# Patient Record
Sex: MALE | Race: WHITE | Employment: OTHER | URBAN - METROPOLITAN AREA
[De-identification: names, ages, dates, MRNs, and addresses within clinical notes are randomized per-mention and may not be internally consistent; named-entity substitution may affect disease eponyms.]

---

## 2024-06-20 ENCOUNTER — HOSPITAL ENCOUNTER (EMERGENCY)
Age: 49
Discharge: HOME | End: 2024-06-21
Attending: EMERGENCY MEDICINE

## 2024-06-20 DIAGNOSIS — F10.920 ACUTE ALCOHOLIC INTOXICATION WITHOUT COMPLICATION (HCC): ICD-10-CM

## 2024-06-20 DIAGNOSIS — R45.851 SUICIDAL IDEATION: Primary | ICD-10-CM

## 2024-06-20 LAB
ALBUMIN SERPL-MCNC: 4.5 G/DL (ref 3.5–5.2)
ALBUMIN/GLOB SERPL: 1.6 {RATIO} (ref 1–2.5)
ALP SERPL-CCNC: 98 U/L (ref 40–129)
ALT SERPL-CCNC: 23 U/L (ref 5–41)
AMPHET UR QL SCN: NEGATIVE
ANION GAP SERPL CALCULATED.3IONS-SCNC: 14 MMOL/L (ref 9–17)
APAP SERPL-MCNC: <5 UG/ML (ref 10–30)
AST SERPL-CCNC: 48 U/L
BARBITURATES UR QL SCN: NEGATIVE
BASOPHILS # BLD: 0.1 K/UL (ref 0–0.2)
BASOPHILS NFR BLD: 3 % (ref 0–2)
BENZODIAZ UR QL: NEGATIVE
BILIRUB SERPL-MCNC: 0.3 MG/DL (ref 0.3–1.2)
BILIRUB UR QL STRIP: NEGATIVE
BUN SERPL-MCNC: 8 MG/DL (ref 6–20)
CALCIUM SERPL-MCNC: 8.6 MG/DL (ref 8.6–10.4)
CANNABINOIDS UR QL SCN: POSITIVE
CHLORIDE SERPL-SCNC: 102 MMOL/L (ref 98–107)
CLARITY UR: CLEAR
CO2 SERPL-SCNC: 26 MMOL/L (ref 20–31)
COCAINE UR QL SCN: NEGATIVE
COLOR UR: YELLOW
COMMENT: NORMAL
CREAT SERPL-MCNC: 0.8 MG/DL (ref 0.7–1.2)
EOSINOPHIL # BLD: 0 K/UL (ref 0–0.4)
EOSINOPHILS RELATIVE PERCENT: 0 % (ref 1–4)
ERYTHROCYTE [DISTWIDTH] IN BLOOD BY AUTOMATED COUNT: 14.3 % (ref 12.5–15.4)
ETHANOL PERCENT: 0.21 %
ETHANOL PERCENT: 0.3 %
ETHANOLAMINE SERPL-MCNC: 210 MG/DL (ref 0–0.08)
ETHANOLAMINE SERPL-MCNC: 301 MG/DL (ref 0–0.08)
FENTANYL UR QL: NEGATIVE
GFR, ESTIMATED: >90 ML/MIN/1.73M2
GLUCOSE SERPL-MCNC: 108 MG/DL (ref 70–99)
GLUCOSE UR STRIP-MCNC: NEGATIVE MG/DL
HCT VFR BLD AUTO: 38.3 % (ref 41–53)
HGB BLD-MCNC: 12.9 G/DL (ref 13.5–17.5)
HGB UR QL STRIP.AUTO: NEGATIVE
KETONES UR STRIP-MCNC: NEGATIVE MG/DL
LEUKOCYTE ESTERASE UR QL STRIP: NEGATIVE
LIPASE SERPL-CCNC: 32 U/L (ref 13–60)
LYMPHOCYTES NFR BLD: 1.1 K/UL (ref 1–4.8)
LYMPHOCYTES RELATIVE PERCENT: 33 % (ref 24–44)
MCH RBC QN AUTO: 31.9 PG (ref 26–34)
MCHC RBC AUTO-ENTMCNC: 33.7 G/DL (ref 31–37)
MCV RBC AUTO: 94.6 FL (ref 80–100)
METHADONE UR QL: NEGATIVE
MONOCYTES NFR BLD: 0.4 K/UL (ref 0.1–1.2)
MONOCYTES NFR BLD: 11 % (ref 2–11)
NEUTROPHILS NFR BLD: 53 % (ref 36–66)
NEUTS SEG NFR BLD: 1.8 K/UL (ref 1.8–7.7)
NITRITE UR QL STRIP: NEGATIVE
OPIATES UR QL SCN: NEGATIVE
OXYCODONE UR QL SCN: NEGATIVE
PCP UR QL SCN: NEGATIVE
PH UR STRIP: 6.5 [PH] (ref 5–8)
PLATELET # BLD AUTO: 142 K/UL (ref 140–450)
PMV BLD AUTO: 7.6 FL (ref 6–12)
POTASSIUM SERPL-SCNC: 4.1 MMOL/L (ref 3.7–5.3)
PROT SERPL-MCNC: 7.4 G/DL (ref 6.4–8.3)
PROT UR STRIP-MCNC: NEGATIVE MG/DL
RBC # BLD AUTO: 4.05 M/UL (ref 4.5–5.9)
SALICYLATES SERPL-MCNC: <1 MG/DL (ref 3–10)
SODIUM SERPL-SCNC: 142 MMOL/L (ref 135–144)
SP GR UR STRIP: 1.01 (ref 1–1.03)
TEST INFORMATION: ABNORMAL
TROPONIN I SERPL HS-MCNC: 8 NG/L (ref 0–22)
UROBILINOGEN UR STRIP-ACNC: NORMAL EU/DL (ref 0–1)
WBC OTHER # BLD: 3.4 K/UL (ref 3.5–11)

## 2024-06-20 PROCEDURE — 93005 ELECTROCARDIOGRAM TRACING: CPT | Performed by: NURSE PRACTITIONER

## 2024-06-20 PROCEDURE — 36415 COLL VENOUS BLD VENIPUNCTURE: CPT

## 2024-06-20 PROCEDURE — 80143 DRUG ASSAY ACETAMINOPHEN: CPT

## 2024-06-20 PROCEDURE — 96376 TX/PRO/DX INJ SAME DRUG ADON: CPT | Performed by: EMERGENCY MEDICINE

## 2024-06-20 PROCEDURE — 99284 EMERGENCY DEPT VISIT MOD MDM: CPT | Performed by: EMERGENCY MEDICINE

## 2024-06-20 PROCEDURE — G0480 DRUG TEST DEF 1-7 CLASSES: HCPCS

## 2024-06-20 PROCEDURE — 85025 COMPLETE CBC W/AUTO DIFF WBC: CPT

## 2024-06-20 PROCEDURE — 96361 HYDRATE IV INFUSION ADD-ON: CPT | Performed by: EMERGENCY MEDICINE

## 2024-06-20 PROCEDURE — 81003 URINALYSIS AUTO W/O SCOPE: CPT

## 2024-06-20 PROCEDURE — 2580000003 HC RX 258: Performed by: NURSE PRACTITIONER

## 2024-06-20 PROCEDURE — 6360000002 HC RX W HCPCS: Performed by: NURSE PRACTITIONER

## 2024-06-20 PROCEDURE — 84484 ASSAY OF TROPONIN QUANT: CPT

## 2024-06-20 PROCEDURE — 96374 THER/PROPH/DIAG INJ IV PUSH: CPT | Performed by: EMERGENCY MEDICINE

## 2024-06-20 PROCEDURE — 80179 DRUG ASSAY SALICYLATE: CPT

## 2024-06-20 PROCEDURE — 80307 DRUG TEST PRSMV CHEM ANLYZR: CPT

## 2024-06-20 PROCEDURE — 83690 ASSAY OF LIPASE: CPT

## 2024-06-20 PROCEDURE — 80053 COMPREHEN METABOLIC PANEL: CPT

## 2024-06-20 RX ORDER — LORAZEPAM 2 MG/ML
1 INJECTION INTRAMUSCULAR ONCE
Status: COMPLETED | OUTPATIENT
Start: 2024-06-20 | End: 2024-06-20

## 2024-06-20 RX ORDER — SODIUM CHLORIDE, SODIUM LACTATE, POTASSIUM CHLORIDE, AND CALCIUM CHLORIDE .6; .31; .03; .02 G/100ML; G/100ML; G/100ML; G/100ML
1000 INJECTION, SOLUTION INTRAVENOUS ONCE
Status: COMPLETED | OUTPATIENT
Start: 2024-06-20 | End: 2024-06-20

## 2024-06-20 RX ADMIN — SODIUM CHLORIDE, POTASSIUM CHLORIDE, SODIUM LACTATE AND CALCIUM CHLORIDE 1000 ML: 600; 310; 30; 20 INJECTION, SOLUTION INTRAVENOUS at 17:25

## 2024-06-20 RX ADMIN — LORAZEPAM 1 MG: 2 INJECTION INTRAMUSCULAR; INTRAVENOUS at 20:11

## 2024-06-20 RX ADMIN — LORAZEPAM 1 MG: 2 INJECTION INTRAMUSCULAR; INTRAVENOUS at 17:25

## 2024-06-20 ASSESSMENT — PAIN SCALES - GENERAL: PAINLEVEL_OUTOF10: 0

## 2024-06-20 ASSESSMENT — PAIN - FUNCTIONAL ASSESSMENT: PAIN_FUNCTIONAL_ASSESSMENT: 0-10

## 2024-06-20 NOTE — PROGRESS NOTES
SPIRITUAL CARE DEPARTMENT [facility]  PROGRESS NOTE    Room # ER04/ER04   Name: Britton Navarro            Restorationism: Taoism    Reason for visit: Emotional Distress    I visited the patient.    Admit Date & Time: 6/20/2024  4:04 PM    Assessment:  Britton Navarro is a 49 y.o. male in the hospital because no active principal problem. Upon entering the room Pt was very friendly and welcoming. Pt was open to conversation.      Intervention:  I introduced myself and my title as  I offered space for Pt  to express feelings, needs, and concerns and provided a ministry presence.  provided empathic Listening and support and spiritual care.  provided prayer and scripture as requested to Pt. Great conversation.    Outcome:  Pt opened up about health issues and was able to share feelings and concerns about family and vocation.issues. pt expressed his needs for consistent mentors and encouragement.    Plan:  Chaplains will remain available to offer spiritual and emotional support as needed.    Electronically signed by Chaplain JARRET, on 6/20/2024 at 6:57 PM.  Spiritual Care Department  [facility]       06/20/24 1849   Encounter Summary   Encounter Overview/Reason Crisis   Service Provided For Patient   Support System Friends/neighbors   Last Encounter  06/20/24   Complexity of Encounter High   Begin Time 1805   End Time  1850   Total Time Calculated 45 min   Crisis   Type Emotional distress   Spiritual/Emotional needs   Type Spiritual Support;Spiritual Distress;Emotional Distress   Grief, Loss, and Adjustments   Type Anticipatory Grief;Grief and loss   Assessment/Intervention/Outcome   Assessment Anxious;Complicated grieving;Decisional conflict   Intervention Active listening;Discussed relationship with God;Discussed meaning/purpose;Discussed death, afterlife   Outcome Coping;Encouraged   Plan and Referrals   Plan/Referrals Continue to visit,

## 2024-06-20 NOTE — ED PROVIDER NOTES
[facility] Emergency Department    [address]  [facility] [facility] [address]  Phone: [phone]  Fax: [phone]  Emergency Department  Faculty Attestation    I performed a history and physical examination of the patient and discussed management with the mid level provider. I reviewed the mid level provider's note and agree with the documented findings and plan of care. Any areas of disagreement are noted on the chart. I was personally present for the key portions of any procedures. I have documented in the chart those procedures where I was not present during the key portions. I have reviewed the emergency nurses triage note. I agree with the chief complaint, past medical history, past surgical history, allergies, medications, social and family history as documented unless otherwise noted below. Documentation of the HPI, Physical Exam and Medical Decision Making performed by medical students or scribes is based on my personal performance of the HPI, PE and MDM. For Physician Assistant/ Nurse Practitioner cases/documentation I have personally evaluated this patient and have completed at least one if not all key elements of the E/M (history, physical exam, and MDM). Additional findings are as noted.      Primary Care Physician:  No primary care provider on file.    CHIEF COMPLAINT       Chief Complaint   Patient presents with    Alcohol Problem     Pt reports he came here today to get medical help for etoh and suicidal thoughts, drinking every day, drinks \"whatever is in front of me\" last drink this am    Suicidal     Pt having suicidal thoughts for a couple of weeks, no plan       RECENT VITALS:   Temp: 98.1 °F (36.7 °C),  Pulse: (!) 107,  , BP: 126/75    LABS:  Labs Reviewed   CBC WITH AUTO DIFFERENTIAL   COMPREHENSIVE METABOLIC PANEL   LIPASE   TROPONIN   URINALYSIS WITH REFLEX TO CULTURE   URINE DRUG SCREEN   ETHANOL   ACETAMINOPHEN LEVEL   SALICYLATE LEVEL         PERTINENT ATTENDING  PHYSICIAN COMMENTS:    EKG: Emergency addition independent interpretation: Sinus 93 with no ischemia.  Axis 86, , QRS 92, .      The patient presents with suicidal thoughts.  He has never attempted suicide before and does not have a plan.  He has a history of alcohol use disorder.  He is currently feeling overwhelmed.  His alcohol use is affecting his personal life and he feels desperation over his situation.  He is seeking help.    On exam, the patient is a bit tremulous and appears a bit upset.  He has mild tachycardia.  He makes reasonably good eye contact and is cooperative. The patient's ethanol level is elevated.  He will not be able to be evaluated for suicidal ideation until he is sober.  The patient will be endorsed to Dr. Goncalves secondary to end of shift.  The patient is transitioned to his care in stable condition.     Poppy Alarcon MD  06/21/24 1510

## 2024-06-20 NOTE — ED PROVIDER NOTES
[facility] EMERGENCY DEPARTMENT  EMERGENCY DEPARTMENT ENCOUNTER      Pt Name: Britton Navarro  MRN: [id number]  Birthdate 1975  Date of evaluation: 6/20/2024  Provider: KENYON Miranda CNP  9:07 AM    CHIEF COMPLAINT       Chief Complaint   Patient presents with    Alcohol Problem     Pt reports he came here today to get medical help for etoh and suicidal thoughts, drinking every day, drinks \"whatever is in front of me\" last drink this am    Suicidal     Pt having suicidal thoughts for a couple of weeks, no plan         HISTORY OF PRESENT ILLNESS    Britton Navarro is a 49 y.o. male who presents to the emergency department for evaluation of alcohol dependence and suicidal thoughts.  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  He states he has been drinking since eighth grade the past few days have been pretty constant.  He states that he is typically drinking more alcohol than water on most days.  He states for the past 2 weeks he has had intrusive and suicidal thoughts.  He has no plan.  He has never attempted suicide before.  He is here today because he feels he has hit rock bottom.  He is tearful, anxious, upset at what his life is amounted to and wants to get some help.  He tells me he feels embarrassed of how much alcohol he drinks daily.  He really never does quantify the amount but tells me he drinks what ever is in front of him all day long    HPI    Nursing Notes were reviewed.    REVIEW OF SYSTEMS       Review of Systems   All other systems reviewed and are negative.      Except as noted above the remainder of the review of systems was reviewed and negative.       PAST MEDICAL HISTORY     Past Medical History:   Diagnosis Date    ETOH abuse          SURGICAL HISTORY    mis-transcribed.)    KENYON Miranda CNP (electronically signed)  Attending Emergency Physician           Dejah Roche APRN - CNP  06/23/24 0967

## 2024-06-21 VITALS
HEART RATE: 84 BPM | SYSTOLIC BLOOD PRESSURE: 121 MMHG | HEIGHT: 70 IN | TEMPERATURE: 98.2 F | BODY MASS INDEX: 22.9 KG/M2 | RESPIRATION RATE: 16 BRPM | WEIGHT: 160 LBS | DIASTOLIC BLOOD PRESSURE: 71 MMHG | OXYGEN SATURATION: 99 %

## 2024-06-21 LAB
EKG ATRIAL RATE: 93 BPM
EKG P AXIS: 73 DEGREES
EKG P-R INTERVAL: 136 MS
EKG Q-T INTERVAL: 370 MS
EKG QRS DURATION: 92 MS
EKG QTC CALCULATION (BAZETT): 460 MS
EKG R AXIS: 86 DEGREES
EKG T AXIS: 80 DEGREES
EKG VENTRICULAR RATE: 93 BPM
ETHANOL PERCENT: 0.03 %
ETHANOLAMINE SERPL-MCNC: 25 MG/DL (ref 0–0.08)

## 2024-06-21 PROCEDURE — 36415 COLL VENOUS BLD VENIPUNCTURE: CPT

## 2024-06-21 PROCEDURE — G0480 DRUG TEST DEF 1-7 CLASSES: HCPCS

## 2024-06-21 RX ORDER — LANOLIN ALCOHOL/MO/W.PET/CERES
3 CREAM (GRAM) TOPICAL ONCE
Status: DISCONTINUED | OUTPATIENT
Start: 2024-06-21 | End: 2024-06-21 | Stop reason: HOSPADM

## 2024-06-21 NOTE — ED NOTES
Phone con with [facility]-serum ethanol showing that pt is no longer intoxicated is required before they will evaluate him. Provider advised.

## 2024-06-21 NOTE — DISCHARGE INSTRUCTIONS
You have been cleared for discharge by psychiatric services they do not believe that you are suicidal the chest intoxicated.  They have a plan for follow-up as an outpatient and you will continue going to AA.  If you feel like you cannot handle your life stressors alone you will reach out to them and return back to an emergency setting if worsening or needing more help in any way.  Recommendation to avoid alcohol if possible.

## 2024-06-21 NOTE — ED PROVIDER NOTES
ATTENDING  ADDENDUM     Care of this patient was assumed from dr. Poppy Alarcon-  The patient was seen for Alcohol Problem (Pt reports he came here today to get medical help for etoh and suicidal thoughts, drinking every day, drinks \"whatever is in front of me\" last drink this am) and Suicidal (Pt having suicidal thoughts for a couple of weeks, no plan)  .  The patient's initial evaluation and plan have been discussed with the prior provider who initially evaluated the patient.  Nursing Notes, Past Medical Hx, Past Surgical Hx, Social Hx, Allergies, and Family Hx were all reviewed.    Reevaluation: When patient was signed out to me as waiting for the patient to get sober enough to be evaluated by psychiatric services.  About 4:00 in the morning patient was medically cleared and psychiatric services did come back into the emergency department to evaluate and interview the patient.  After their interview they decided the patient did not need to be admitted for suicidal ideation and that there is most mostly an alcohol problem and the lack of sleep.  Patient states that he is not suicidal he is never really thought about hurting himself.  Psychiatric services has cleared him and has a plan for him to return to - he will get picked up by one of his family members- he does have home support.    DIFFERENTIAL DIAGNOSIS/ MDM:           Follow Exit Care instructions closely.    I have reviewed the disposition diagnosis with the patient and or their family/guardian. I have answered their questions and given discharge instructions. They voiced understanding of these instructions and did not have any further questions or complaints.    DIAGNOSTIC RESULTS     RADIOLOGY:   Non-plain film images such as CT, Ultrasound and MRI are read by the radiologist. Plain radiographic images are visualized and preliminarily interpreted by the emergency physician with the below findings:  No orders to display       LABS:  Results for orders  NEGATIVE NEGATIVE    Leukocyte Esterase, Urine NEGATIVE NEGATIVE    Comment       Microscopic exam not performed based on chemical results unless requested in original order.    Comment          Comment       Utilizing a urinalysis as the only screening method to exclude a potential uropathogen can be unreliable in many patient populations.  Rapid screening tests are less sensitive than culture and if UTI is a clinical possibility, culture should be considered despite a negative urinalysis.     Urine Drug Screen   Result Value Ref Range    Amphetamine Screen, Ur NEGATIVE NEGATIVE    Barbiturate Screen, Ur NEGATIVE NEGATIVE    Benzodiazepine Screen, Urine NEGATIVE NEGATIVE    Cocaine Metabolite, Urine NEGATIVE NEGATIVE    Methadone Screen, Urine NEGATIVE NEGATIVE    Opiates, Urine NEGATIVE NEGATIVE    Phencyclidine, Urine NEGATIVE NEGATIVE    Cannabinoid Scrn, Ur POSITIVE (A) NEGATIVE    Oxycodone Screen, Ur NEGATIVE NEGATIVE    Fentanyl, Ur NEGATIVE NEGATIVE    Test Information       Assay provides medical screening only.  The absence of expected drug(s) and/or metabolite(s) may indicate diluted or adulterated urine, limitations of testing or timing of collection.   Ethanol   Result Value Ref Range    Ethanol Lvl 301 (HH) <10 mg/dL    Ethanol percent 0.301 (H) <0.010 %   Acetaminophen Level   Result Value Ref Range    Acetaminophen Level <5 (L) 10 - 30 ug/mL   Salicylate   Result Value Ref Range    Salicylate Lvl <1.0 (L) 3 - 10 mg/dL   Ethanol   Result Value Ref Range    Ethanol Lvl 210 (H) <10 mg/dL    Ethanol percent 0.210 (H) <0.010 %   Ethanol   Result Value Ref Range    Ethanol Lvl 25 (H) <10 mg/dL    Ethanol percent 0.025 (H) <0.010 %   EKG 12 Lead   Result Value Ref Range    Ventricular Rate 93 BPM    Atrial Rate 93 BPM    P-R Interval 136 ms    QRS Duration 92 ms    Q-T Interval 370 ms    QTc Calculation (Bazett) 460 ms    P Axis 73 degrees    R Axis 86 degrees    T Axis 80 degrees       ABNORMAL

## 2024-06-21 NOTE — ED NOTES
[facility] Crisis called, will be here to evaluate pt in appx 45 minutes.

## 2024-08-25 ENCOUNTER — HOSPITAL ENCOUNTER (INPATIENT)
Age: 49
LOS: 1 days | Discharge: HOME | DRG: 897 | End: 2024-08-26
Attending: EMERGENCY MEDICINE | Admitting: STUDENT IN AN ORGANIZED HEALTH CARE EDUCATION/TRAINING PROGRAM
Payer: COMMERCIAL

## 2024-08-25 ENCOUNTER — APPOINTMENT (OUTPATIENT)
Dept: CT IMAGING | Age: 49
DRG: 897 | End: 2024-08-25
Payer: COMMERCIAL

## 2024-08-25 ENCOUNTER — APPOINTMENT (OUTPATIENT)
Dept: GENERAL RADIOLOGY | Age: 49
DRG: 897 | End: 2024-08-25
Payer: COMMERCIAL

## 2024-08-25 DIAGNOSIS — F10.931 ALCOHOL WITHDRAWAL DELIRIUM, ACUTE, HYPERACTIVE (HCC): ICD-10-CM

## 2024-08-25 DIAGNOSIS — F10.920 ACUTE ALCOHOLIC INTOXICATION WITHOUT COMPLICATION (HCC): Primary | ICD-10-CM

## 2024-08-25 PROBLEM — F10.221 ACUTE ALCOHOL INTOXICATION DELIRIUM WITH MODERATE OR SEVERE USE DISORDER (HCC): Status: ACTIVE | Noted: 2024-08-25

## 2024-08-25 LAB
ALBUMIN SERPL-MCNC: 4.3 G/DL (ref 3.5–5.2)
ALBUMIN/GLOB SERPL: 1.3 {RATIO} (ref 1–2.5)
ALP SERPL-CCNC: 103 U/L (ref 40–129)
ALT SERPL-CCNC: 15 U/L (ref 5–41)
AMPHET UR QL SCN: NEGATIVE
ANION GAP SERPL CALCULATED.3IONS-SCNC: 17 MMOL/L (ref 9–17)
AST SERPL-CCNC: 32 U/L
BACTERIA URNS QL MICRO: ABNORMAL
BARBITURATES UR QL SCN: NEGATIVE
BASOPHILS # BLD: 0 K/UL (ref 0–0.2)
BASOPHILS NFR BLD: 0 % (ref 0–2)
BENZODIAZ UR QL: NEGATIVE
BILIRUB DIRECT SERPL-MCNC: 0.1 MG/DL
BILIRUB INDIRECT SERPL-MCNC: 0.3 MG/DL (ref 0–1)
BILIRUB SERPL-MCNC: 0.4 MG/DL (ref 0.3–1.2)
BILIRUB UR QL STRIP: NEGATIVE
BUN SERPL-MCNC: 4 MG/DL (ref 6–20)
CALCIUM SERPL-MCNC: 8.4 MG/DL (ref 8.6–10.4)
CANNABINOIDS UR QL SCN: NEGATIVE
CHLORIDE SERPL-SCNC: 99 MMOL/L (ref 98–107)
CLARITY UR: CLEAR
CO2 SERPL-SCNC: 26 MMOL/L (ref 20–31)
COCAINE UR QL SCN: NEGATIVE
COLOR UR: YELLOW
CREAT SERPL-MCNC: 0.8 MG/DL (ref 0.7–1.2)
EOSINOPHIL # BLD: 0 K/UL (ref 0–0.4)
EOSINOPHILS RELATIVE PERCENT: 0 % (ref 1–4)
EPI CELLS #/AREA URNS HPF: ABNORMAL /HPF (ref 0–5)
ERYTHROCYTE [DISTWIDTH] IN BLOOD BY AUTOMATED COUNT: 14.6 % (ref 12.5–15.4)
ETHANOL PERCENT: 0.48 %
ETHANOLAMINE SERPL-MCNC: 479 MG/DL (ref 0–0.08)
FENTANYL UR QL: NEGATIVE
GFR, ESTIMATED: >90 ML/MIN/1.73M2
GLUCOSE SERPL-MCNC: 111 MG/DL (ref 70–99)
GLUCOSE UR STRIP-MCNC: NEGATIVE MG/DL
HCT VFR BLD AUTO: 36.5 % (ref 41–53)
HGB BLD-MCNC: 12.2 G/DL (ref 13.5–17.5)
HGB UR QL STRIP.AUTO: NEGATIVE
KETONES UR STRIP-MCNC: NEGATIVE MG/DL
LEUKOCYTE ESTERASE UR QL STRIP: NEGATIVE
LIPASE SERPL-CCNC: 25 U/L (ref 13–60)
LYMPHOCYTES NFR BLD: 0.81 K/UL (ref 1–4.8)
LYMPHOCYTES RELATIVE PERCENT: 29 % (ref 24–44)
MAGNESIUM SERPL-MCNC: 1.7 MG/DL (ref 1.6–2.6)
MCH RBC QN AUTO: 31.5 PG (ref 26–34)
MCHC RBC AUTO-ENTMCNC: 33.4 G/DL (ref 31–37)
MCV RBC AUTO: 94.4 FL (ref 80–100)
METHADONE UR QL: NEGATIVE
MONOCYTES NFR BLD: 0.11 K/UL (ref 0.1–0.8)
MONOCYTES NFR BLD: 4 % (ref 1–7)
MORPHOLOGY: NORMAL
NEUTROPHILS NFR BLD: 67 % (ref 36–66)
NEUTS SEG NFR BLD: 1.88 K/UL (ref 1.8–7.7)
NITRITE UR QL STRIP: NEGATIVE
OPIATES UR QL SCN: NEGATIVE
OXYCODONE UR QL SCN: NEGATIVE
PCP UR QL SCN: NEGATIVE
PH UR STRIP: 6 [PH] (ref 5–8)
PLATELET # BLD AUTO: 101 K/UL (ref 140–450)
PMV BLD AUTO: 7.3 FL (ref 6–12)
POTASSIUM SERPL-SCNC: 3.7 MMOL/L (ref 3.7–5.3)
PROT SERPL-MCNC: 7.5 G/DL (ref 6.4–8.3)
PROT UR STRIP-MCNC: NEGATIVE MG/DL
RBC # BLD AUTO: 3.87 M/UL (ref 4.5–5.9)
RBC #/AREA URNS HPF: ABNORMAL /HPF (ref 0–2)
SODIUM SERPL-SCNC: 142 MMOL/L (ref 135–144)
SP GR UR STRIP: 1 (ref 1–1.03)
TEST INFORMATION: NORMAL
UROBILINOGEN UR STRIP-ACNC: NORMAL EU/DL (ref 0–1)
WBC #/AREA URNS HPF: ABNORMAL /HPF (ref 0–5)
WBC OTHER # BLD: 2.8 K/UL (ref 3.5–11)

## 2024-08-25 PROCEDURE — 99285 EMERGENCY DEPT VISIT HI MDM: CPT

## 2024-08-25 PROCEDURE — 80307 DRUG TEST PRSMV CHEM ANLYZR: CPT

## 2024-08-25 PROCEDURE — 83690 ASSAY OF LIPASE: CPT

## 2024-08-25 PROCEDURE — 2580000003 HC RX 258

## 2024-08-25 PROCEDURE — 85025 COMPLETE CBC W/AUTO DIFF WBC: CPT

## 2024-08-25 PROCEDURE — 71045 X-RAY EXAM CHEST 1 VIEW: CPT

## 2024-08-25 PROCEDURE — 2500000003 HC RX 250 WO HCPCS

## 2024-08-25 PROCEDURE — 83735 ASSAY OF MAGNESIUM: CPT

## 2024-08-25 PROCEDURE — 96374 THER/PROPH/DIAG INJ IV PUSH: CPT

## 2024-08-25 PROCEDURE — 93005 ELECTROCARDIOGRAM TRACING: CPT

## 2024-08-25 PROCEDURE — 6360000002 HC RX W HCPCS

## 2024-08-25 PROCEDURE — G0480 DRUG TEST DEF 1-7 CLASSES: HCPCS

## 2024-08-25 PROCEDURE — 80076 HEPATIC FUNCTION PANEL: CPT

## 2024-08-25 PROCEDURE — 80048 BASIC METABOLIC PNL TOTAL CA: CPT

## 2024-08-25 PROCEDURE — 72125 CT NECK SPINE W/O DYE: CPT

## 2024-08-25 PROCEDURE — 81001 URINALYSIS AUTO W/SCOPE: CPT

## 2024-08-25 PROCEDURE — 70450 CT HEAD/BRAIN W/O DYE: CPT

## 2024-08-25 RX ORDER — LORAZEPAM 2 MG/ML
2 INJECTION INTRAMUSCULAR
Status: DISCONTINUED | OUTPATIENT
Start: 2024-08-25 | End: 2024-08-25

## 2024-08-25 RX ORDER — SODIUM CHLORIDE 9 MG/ML
INJECTION, SOLUTION INTRAVENOUS PRN
Status: DISCONTINUED | OUTPATIENT
Start: 2024-08-25 | End: 2024-08-26 | Stop reason: HOSPADM

## 2024-08-25 RX ORDER — LORAZEPAM 1 MG/1
4 TABLET ORAL
Status: DISCONTINUED | OUTPATIENT
Start: 2024-08-25 | End: 2024-08-25

## 2024-08-25 RX ORDER — LORAZEPAM 1 MG/1
3 TABLET ORAL
Status: DISCONTINUED | OUTPATIENT
Start: 2024-08-25 | End: 2024-08-25

## 2024-08-25 RX ORDER — LORAZEPAM 2 MG/ML
3 INJECTION INTRAMUSCULAR
Status: DISCONTINUED | OUTPATIENT
Start: 2024-08-25 | End: 2024-08-25

## 2024-08-25 RX ORDER — ONDANSETRON 2 MG/ML
4 INJECTION INTRAMUSCULAR; INTRAVENOUS EVERY 6 HOURS PRN
Status: DISCONTINUED | OUTPATIENT
Start: 2024-08-25 | End: 2024-08-26 | Stop reason: HOSPADM

## 2024-08-25 RX ORDER — ACETAMINOPHEN 650 MG/1
650 SUPPOSITORY RECTAL EVERY 6 HOURS PRN
Status: DISCONTINUED | OUTPATIENT
Start: 2024-08-25 | End: 2024-08-26 | Stop reason: HOSPADM

## 2024-08-25 RX ORDER — LORAZEPAM 1 MG/1
2 TABLET ORAL
Status: DISCONTINUED | OUTPATIENT
Start: 2024-08-25 | End: 2024-08-25

## 2024-08-25 RX ORDER — POTASSIUM CHLORIDE 7.45 MG/ML
10 INJECTION INTRAVENOUS PRN
Status: DISCONTINUED | OUTPATIENT
Start: 2024-08-25 | End: 2024-08-26 | Stop reason: HOSPADM

## 2024-08-25 RX ORDER — SODIUM CHLORIDE, SODIUM LACTATE, POTASSIUM CHLORIDE, AND CALCIUM CHLORIDE .6; .31; .03; .02 G/100ML; G/100ML; G/100ML; G/100ML
1000 INJECTION, SOLUTION INTRAVENOUS ONCE
Status: COMPLETED | OUTPATIENT
Start: 2024-08-25 | End: 2024-08-25

## 2024-08-25 RX ORDER — ONDANSETRON 4 MG/1
4 TABLET, ORALLY DISINTEGRATING ORAL EVERY 8 HOURS PRN
Status: DISCONTINUED | OUTPATIENT
Start: 2024-08-25 | End: 2024-08-26 | Stop reason: HOSPADM

## 2024-08-25 RX ORDER — MAGNESIUM SULFATE IN WATER 40 MG/ML
2000 INJECTION, SOLUTION INTRAVENOUS PRN
Status: DISCONTINUED | OUTPATIENT
Start: 2024-08-25 | End: 2024-08-26 | Stop reason: HOSPADM

## 2024-08-25 RX ORDER — POLYETHYLENE GLYCOL 3350 17 G/17G
17 POWDER, FOR SOLUTION ORAL DAILY PRN
Status: DISCONTINUED | OUTPATIENT
Start: 2024-08-25 | End: 2024-08-26 | Stop reason: HOSPADM

## 2024-08-25 RX ORDER — SODIUM CHLORIDE 9 MG/ML
INJECTION, SOLUTION INTRAVENOUS CONTINUOUS
Status: DISCONTINUED | OUTPATIENT
Start: 2024-08-25 | End: 2024-08-26 | Stop reason: HOSPADM

## 2024-08-25 RX ORDER — ACETAMINOPHEN 325 MG/1
650 TABLET ORAL EVERY 6 HOURS PRN
Status: DISCONTINUED | OUTPATIENT
Start: 2024-08-25 | End: 2024-08-26 | Stop reason: HOSPADM

## 2024-08-25 RX ORDER — SODIUM CHLORIDE 0.9 % (FLUSH) 0.9 %
10 SYRINGE (ML) INJECTION PRN
Status: DISCONTINUED | OUTPATIENT
Start: 2024-08-25 | End: 2024-08-26 | Stop reason: HOSPADM

## 2024-08-25 RX ORDER — LORAZEPAM 2 MG/ML
4 INJECTION INTRAMUSCULAR
Status: DISCONTINUED | OUTPATIENT
Start: 2024-08-25 | End: 2024-08-25

## 2024-08-25 RX ORDER — GAUZE BANDAGE 2" X 2"
100 BANDAGE TOPICAL DAILY
Status: DISCONTINUED | OUTPATIENT
Start: 2024-08-26 | End: 2024-08-26

## 2024-08-25 RX ORDER — LORAZEPAM 2 MG/ML
1 INJECTION INTRAMUSCULAR
Status: DISCONTINUED | OUTPATIENT
Start: 2024-08-25 | End: 2024-08-25

## 2024-08-25 RX ORDER — LORAZEPAM 1 MG/1
1 TABLET ORAL
Status: DISCONTINUED | OUTPATIENT
Start: 2024-08-25 | End: 2024-08-25

## 2024-08-25 RX ORDER — SODIUM CHLORIDE 0.9 % (FLUSH) 0.9 %
5-40 SYRINGE (ML) INJECTION EVERY 12 HOURS SCHEDULED
Status: DISCONTINUED | OUTPATIENT
Start: 2024-08-25 | End: 2024-08-26 | Stop reason: HOSPADM

## 2024-08-25 RX ORDER — POTASSIUM CHLORIDE 1500 MG/1
40 TABLET, EXTENDED RELEASE ORAL PRN
Status: DISCONTINUED | OUTPATIENT
Start: 2024-08-25 | End: 2024-08-26 | Stop reason: HOSPADM

## 2024-08-25 RX ADMIN — SODIUM CHLORIDE, POTASSIUM CHLORIDE, SODIUM LACTATE AND CALCIUM CHLORIDE 1000 ML: 600; 310; 30; 20 INJECTION, SOLUTION INTRAVENOUS at 23:00

## 2024-08-25 RX ADMIN — THIAMINE HYDROCHLORIDE: 100 INJECTION, SOLUTION INTRAMUSCULAR; INTRAVENOUS at 23:00

## 2024-08-26 VITALS
WEIGHT: 160.72 LBS | HEART RATE: 100 BPM | HEIGHT: 70 IN | SYSTOLIC BLOOD PRESSURE: 131 MMHG | RESPIRATION RATE: 17 BRPM | OXYGEN SATURATION: 95 % | BODY MASS INDEX: 23.01 KG/M2 | TEMPERATURE: 98.2 F | DIASTOLIC BLOOD PRESSURE: 95 MMHG

## 2024-08-26 PROBLEM — Z78.9 DAILY CONSUMPTION OF ALCOHOL: Status: ACTIVE | Noted: 2024-08-26

## 2024-08-26 PROBLEM — F10.931 ALCOHOL WITHDRAWAL DELIRIUM, ACUTE, HYPERACTIVE (HCC): Status: ACTIVE | Noted: 2024-08-26

## 2024-08-26 PROBLEM — F10.920 ACUTE ALCOHOLIC INTOXICATION WITHOUT COMPLICATION (HCC): Status: ACTIVE | Noted: 2024-08-26

## 2024-08-26 PROBLEM — F10.221 ACUTE ALCOHOL INTOXICATION DELIRIUM WITH MODERATE OR SEVERE USE DISORDER (HCC): Status: RESOLVED | Noted: 2024-08-25 | Resolved: 2024-08-26

## 2024-08-26 PROBLEM — D61.818 PANCYTOPENIA (HCC): Status: ACTIVE | Noted: 2024-08-26

## 2024-08-26 PROBLEM — D69.6 THROMBOCYTOPENIA (HCC): Status: ACTIVE | Noted: 2024-08-26

## 2024-08-26 LAB
BASOPHILS # BLD: 0.1 K/UL (ref 0–0.2)
BASOPHILS NFR BLD: 2 % (ref 0–2)
CA-I BLD-SCNC: 1.07 MMOL/L (ref 1.13–1.33)
EKG ATRIAL RATE: 103 BPM
EKG P AXIS: 59 DEGREES
EKG P-R INTERVAL: 144 MS
EKG Q-T INTERVAL: 368 MS
EKG QRS DURATION: 90 MS
EKG QTC CALCULATION (BAZETT): 482 MS
EKG R AXIS: 81 DEGREES
EKG T AXIS: 67 DEGREES
EKG VENTRICULAR RATE: 103 BPM
EOSINOPHIL # BLD: 0 K/UL (ref 0–0.4)
EOSINOPHILS RELATIVE PERCENT: 1 % (ref 1–4)
ERYTHROCYTE [DISTWIDTH] IN BLOOD BY AUTOMATED COUNT: 15 % (ref 12.5–15.4)
EST. AVERAGE GLUCOSE BLD GHB EST-MCNC: 108 MG/DL
ETHANOL PERCENT: 0.02 %
ETHANOL PERCENT: 0.2 %
ETHANOL PERCENT: 0.26 %
ETHANOLAMINE SERPL-MCNC: 18 MG/DL (ref 0–0.08)
ETHANOLAMINE SERPL-MCNC: 198 MG/DL (ref 0–0.08)
ETHANOLAMINE SERPL-MCNC: 257 MG/DL (ref 0–0.08)
FOLATE SERPL-MCNC: 13.2 NG/ML (ref 4.8–24.2)
HBA1C MFR BLD: 5.4 % (ref 4–6)
HCT VFR BLD AUTO: 33.6 % (ref 41–53)
HGB BLD-MCNC: 11.2 G/DL (ref 13.5–17.5)
IMM RETICS NFR: 16.7 % (ref 2.7–18.3)
INR PPP: 1
LYMPHOCYTES NFR BLD: 1.4 K/UL (ref 1–4.8)
LYMPHOCYTES RELATIVE PERCENT: 44 % (ref 24–44)
MAGNESIUM SERPL-MCNC: 1.6 MG/DL (ref 1.6–2.6)
MCH RBC QN AUTO: 31.6 PG (ref 26–34)
MCHC RBC AUTO-ENTMCNC: 33.3 G/DL (ref 31–37)
MCV RBC AUTO: 94.8 FL (ref 80–100)
MONOCYTES NFR BLD: 0.3 K/UL (ref 0.1–1.2)
MONOCYTES NFR BLD: 10 % (ref 2–11)
NEUTROPHILS NFR BLD: 43 % (ref 36–66)
NEUTS SEG NFR BLD: 1.4 K/UL (ref 1.8–7.7)
PHOSPHATE SERPL-MCNC: 3.4 MG/DL (ref 2.5–4.5)
PLATELET # BLD AUTO: 78 K/UL (ref 140–450)
PMV BLD AUTO: 7.3 FL (ref 6–12)
PROTHROMBIN TIME: 10.7 SEC (ref 9.4–12.6)
RBC # BLD AUTO: 3.55 M/UL (ref 4.5–5.9)
RETIC HEMOGLOBIN: 38 PG (ref 28.2–35.7)
RETICS # AUTO: 0.12 M/UL (ref 0.03–0.08)
RETICS/RBC NFR AUTO: 3.2 % (ref 0.5–1.9)
TSH SERPL DL<=0.05 MIU/L-ACNC: 2.31 UIU/ML (ref 0.3–5)
VIT B12 SERPL-MCNC: 726 PG/ML (ref 232–1245)
WBC OTHER # BLD: 3.2 K/UL (ref 3.5–11)

## 2024-08-26 PROCEDURE — 85025 COMPLETE CBC W/AUTO DIFF WBC: CPT

## 2024-08-26 PROCEDURE — 6360000002 HC RX W HCPCS

## 2024-08-26 PROCEDURE — 2580000003 HC RX 258

## 2024-08-26 PROCEDURE — 83735 ASSAY OF MAGNESIUM: CPT

## 2024-08-26 PROCEDURE — 36415 COLL VENOUS BLD VENIPUNCTURE: CPT

## 2024-08-26 PROCEDURE — 99223 1ST HOSP IP/OBS HIGH 75: CPT | Performed by: STUDENT IN AN ORGANIZED HEALTH CARE EDUCATION/TRAINING PROGRAM

## 2024-08-26 PROCEDURE — 82607 VITAMIN B-12: CPT

## 2024-08-26 PROCEDURE — 84100 ASSAY OF PHOSPHORUS: CPT

## 2024-08-26 PROCEDURE — 1210000000 HC MED SURG R&B

## 2024-08-26 PROCEDURE — 85610 PROTHROMBIN TIME: CPT

## 2024-08-26 PROCEDURE — 6360000002 HC RX W HCPCS: Performed by: STUDENT IN AN ORGANIZED HEALTH CARE EDUCATION/TRAINING PROGRAM

## 2024-08-26 PROCEDURE — 2580000003 HC RX 258: Performed by: NURSE PRACTITIONER

## 2024-08-26 PROCEDURE — G0378 HOSPITAL OBSERVATION PER HR: HCPCS

## 2024-08-26 PROCEDURE — 84443 ASSAY THYROID STIM HORMONE: CPT

## 2024-08-26 PROCEDURE — 85045 AUTOMATED RETICULOCYTE COUNT: CPT

## 2024-08-26 PROCEDURE — APPSS45 APP SPLIT SHARED TIME 31-45 MINUTES

## 2024-08-26 PROCEDURE — 99255 IP/OBS CONSLTJ NEW/EST HI 80: CPT | Performed by: INTERNAL MEDICINE

## 2024-08-26 PROCEDURE — G0480 DRUG TEST DEF 1-7 CLASSES: HCPCS

## 2024-08-26 PROCEDURE — 82330 ASSAY OF CALCIUM: CPT

## 2024-08-26 PROCEDURE — 83036 HEMOGLOBIN GLYCOSYLATED A1C: CPT

## 2024-08-26 PROCEDURE — 82525 ASSAY OF COPPER: CPT

## 2024-08-26 PROCEDURE — 6360000002 HC RX W HCPCS: Performed by: NURSE PRACTITIONER

## 2024-08-26 PROCEDURE — 82746 ASSAY OF FOLIC ACID SERUM: CPT

## 2024-08-26 PROCEDURE — 2500000003 HC RX 250 WO HCPCS

## 2024-08-26 RX ORDER — LORAZEPAM 2 MG/ML
2 INJECTION INTRAMUSCULAR
Status: DISCONTINUED | OUTPATIENT
Start: 2024-08-26 | End: 2024-08-26 | Stop reason: HOSPADM

## 2024-08-26 RX ORDER — LORAZEPAM 2 MG/ML
2 INJECTION INTRAMUSCULAR ONCE
Status: COMPLETED | OUTPATIENT
Start: 2024-08-26 | End: 2024-08-26

## 2024-08-26 RX ORDER — LORAZEPAM 0.5 MG/1
0.5 TABLET ORAL EVERY 8 HOURS PRN
Qty: 4 TABLET | Refills: 0 | Status: SHIPPED | OUTPATIENT
Start: 2024-08-26 | End: 2024-08-28

## 2024-08-26 RX ORDER — SODIUM CHLORIDE 0.9 % (FLUSH) 0.9 %
5-40 SYRINGE (ML) INJECTION EVERY 12 HOURS SCHEDULED
Status: DISCONTINUED | OUTPATIENT
Start: 2024-08-26 | End: 2024-08-26 | Stop reason: HOSPADM

## 2024-08-26 RX ORDER — SODIUM CHLORIDE 0.9 % (FLUSH) 0.9 %
5-40 SYRINGE (ML) INJECTION PRN
Status: DISCONTINUED | OUTPATIENT
Start: 2024-08-26 | End: 2024-08-26 | Stop reason: HOSPADM

## 2024-08-26 RX ORDER — SODIUM CHLORIDE 9 MG/ML
INJECTION, SOLUTION INTRAVENOUS PRN
Status: DISCONTINUED | OUTPATIENT
Start: 2024-08-26 | End: 2024-08-26 | Stop reason: HOSPADM

## 2024-08-26 RX ORDER — LORAZEPAM 1 MG/1
4 TABLET ORAL
Status: DISCONTINUED | OUTPATIENT
Start: 2024-08-26 | End: 2024-08-26 | Stop reason: HOSPADM

## 2024-08-26 RX ORDER — LORAZEPAM 1 MG/1
1 TABLET ORAL
Status: DISCONTINUED | OUTPATIENT
Start: 2024-08-26 | End: 2024-08-26 | Stop reason: HOSPADM

## 2024-08-26 RX ORDER — LORAZEPAM 2 MG/ML
1 INJECTION INTRAMUSCULAR
Status: DISCONTINUED | OUTPATIENT
Start: 2024-08-26 | End: 2024-08-26 | Stop reason: HOSPADM

## 2024-08-26 RX ORDER — LORAZEPAM 1 MG/1
3 TABLET ORAL
Status: DISCONTINUED | OUTPATIENT
Start: 2024-08-26 | End: 2024-08-26 | Stop reason: HOSPADM

## 2024-08-26 RX ORDER — DIPHENHYDRAMINE HYDROCHLORIDE 50 MG/ML
25 INJECTION INTRAMUSCULAR; INTRAVENOUS ONCE
Status: COMPLETED | OUTPATIENT
Start: 2024-08-26 | End: 2024-08-26

## 2024-08-26 RX ORDER — LORAZEPAM 2 MG/ML
3 INJECTION INTRAMUSCULAR
Status: DISCONTINUED | OUTPATIENT
Start: 2024-08-26 | End: 2024-08-26 | Stop reason: HOSPADM

## 2024-08-26 RX ORDER — LORAZEPAM 1 MG/1
2 TABLET ORAL
Status: DISCONTINUED | OUTPATIENT
Start: 2024-08-26 | End: 2024-08-26 | Stop reason: HOSPADM

## 2024-08-26 RX ORDER — HALOPERIDOL 5 MG/ML
5 INJECTION INTRAMUSCULAR ONCE
Status: COMPLETED | OUTPATIENT
Start: 2024-08-26 | End: 2024-08-26

## 2024-08-26 RX ORDER — LORAZEPAM 2 MG/ML
4 INJECTION INTRAMUSCULAR
Status: DISCONTINUED | OUTPATIENT
Start: 2024-08-26 | End: 2024-08-26 | Stop reason: HOSPADM

## 2024-08-26 RX ORDER — GAUZE BANDAGE 2" X 2"
100 BANDAGE TOPICAL DAILY
Status: DISCONTINUED | OUTPATIENT
Start: 2024-08-27 | End: 2024-08-26 | Stop reason: SDUPTHER

## 2024-08-26 RX ADMIN — SODIUM CHLORIDE: 9 INJECTION, SOLUTION INTRAVENOUS at 06:20

## 2024-08-26 RX ADMIN — LORAZEPAM 2 MG: 2 INJECTION, SOLUTION INTRAMUSCULAR; INTRAVENOUS at 12:54

## 2024-08-26 RX ADMIN — THIAMINE HYDROCHLORIDE: 100 INJECTION, SOLUTION INTRAMUSCULAR; INTRAVENOUS at 09:07

## 2024-08-26 RX ADMIN — HALOPERIDOL LACTATE 5 MG: 5 INJECTION, SOLUTION INTRAMUSCULAR at 01:09

## 2024-08-26 RX ADMIN — MAGNESIUM SULFATE HEPTAHYDRATE 2000 MG: 40 INJECTION, SOLUTION INTRAVENOUS at 08:56

## 2024-08-26 RX ADMIN — DIPHENHYDRAMINE HYDROCHLORIDE 25 MG: 50 INJECTION INTRAMUSCULAR; INTRAVENOUS at 01:37

## 2024-08-26 RX ADMIN — LORAZEPAM 2 MG: 2 INJECTION, SOLUTION INTRAMUSCULAR; INTRAVENOUS at 17:07

## 2024-08-26 RX ADMIN — LORAZEPAM 2 MG: 2 INJECTION, SOLUTION INTRAMUSCULAR; INTRAVENOUS at 01:37

## 2024-08-26 RX ADMIN — LORAZEPAM 1 MG: 2 INJECTION, SOLUTION INTRAMUSCULAR; INTRAVENOUS at 18:11

## 2024-08-26 NOTE — PLAN OF CARE
Problem: Discharge Planning  Goal: Discharge to home or other facility with appropriate resources  Outcome: Progressing  Flowsheets (Taken 8/26/2024 0529)  Discharge to home or other facility with appropriate resources:   Identify barriers to discharge with patient and caregiver   Arrange for needed discharge resources and transportation as appropriate   Identify discharge learning needs (meds, wound care, etc)   Arrange for interpreters to assist at discharge as needed   Refer to discharge planning if patient needs post-hospital services based on physician order or complex needs related to functional status, cognitive ability or social support system     Problem: Pain  Goal: Verbalizes/displays adequate comfort level or baseline comfort level  Outcome: Progressing  Flowsheets (Taken 8/26/2024 0529)  Verbalizes/displays adequate comfort level or baseline comfort level:   Encourage patient to monitor pain and request assistance   Assess pain using appropriate pain scale   Administer analgesics based on type and severity of pain and evaluate response   Implement non-pharmacological measures as appropriate and evaluate response   Consider cultural and social influences on pain and pain management   Notify Licensed Independent Practitioner if interventions unsuccessful or patient reports new pain     Problem: Safety - Adult  Goal: Free from fall injury  Outcome: Progressing  Flowsheets (Taken 8/26/2024 0529)  Free From Fall Injury: Instruct family/caregiver on patient safety     Problem: Gastrointestinal - Adult  Goal: Maintains adequate nutritional intake  Outcome: Progressing  Flowsheets (Taken 8/26/2024 0529)  Maintains adequate nutritional intake:   Monitor percentage of each meal consumed   Assist with meals as needed   Obtain nutritional consult as needed   Identify factors contributing to decreased intake, treat as appropriate   Monitor intake and output, weight and lab values     Problem: Metabolic/Fluid and

## 2024-08-26 NOTE — PROGRESS NOTES
As required by CMS, I notified the attending physician restraints were ordered on 08/26/2024 @ 0127 for Britton Navarro. Acknowledgement of this order by the attending physician was confirmed.

## 2024-08-26 NOTE — DISCHARGE SUMMARY
[facility]  Office: [phone]  Diony Erickson DO, Hunter Teran DO, Lorenzo Bobo DO, Gabriel Britton DO, Anita Ingram MD, Marlys Ramirez MD, Yudelka Montenegro MD, Emilee Barnhart MD,  Gregg Nassar MD, Ashlie Conteh MD, Ja Gomez DO, Sarah Wood MD,  Jessy Vincent DO, Yakov Núñez MD, Vidal Stinson MD, Britton Erickson DO, Griselda Schultz MD,  Ken Smith DO, Debbie White MD, Ellie Reis MD, Clarissa Hyde MD, Gurpreet Dasilva MD,  Rafat Warner MD, Jeramy Urias MD, Oanh Belle MD, Lloyd Dumont DO, Sari Santoyo MD,  Elton Almonte MD, Shirley Waterhouse, CNP,  Mechelle Calhoun, CNP, Belkys Dick, CNP, Bishop Gonzalez, CNP,  Maria Del Carmen Figueroa, DNP, Nadeen Mcbride, CNP, Ashley Soriano, CNP, Lauren Rodriguez, CNP, Rosamaria Dunbar, CNP, Miladys Bright, CNP, Jerry Cobb PA-C, Razia Chao, CNS, Renate Ontiveros, CNP, Tracey Olson, CNP         [facility]   IN-PATIENT SERVICE   [facility]    Discharge Summary     Patient ID: Britton Navarro  :  1975   MRN: [id number]     ACCOUNT:  [id number]   Patient's PCP: No primary care provider on file.  Admit Date: 2024   Discharge Date: 2024  Length of Stay: 0  Code Status:  Full Code  Admitting Physician: Drake Turpin MD  Discharge Physician: Drake Turpin MD     Active Discharge Diagnoses:     Hospital Problem Lists:  Principal Problem (Resolved):    Acute alcohol intoxication delirium with moderate or severe use disorder (HCC)  Active Problems:    Daily consumption of alcohol    Pancytopenia (HCC)    Acute alcoholic intoxication without complication (HCC)    Alcohol withdrawal delirium, acute, hyperactive (HCC)      Admission Condition:  poor     Discharged Condition: good    Hospital Stay:     Hospital Course:  Britton Navarro is a 49 y.o. male who was admitted for the management of Acute alcohol intoxication delirium with moderate or severe use disorder (HCC) , presented to ER with  PHARMACY [id number] - [place] - [address] [place] [place] [phone] - F [phone]  [address] [place] [address]      Phone: [phone]   LORazepam 0.5 MG tablet         Time spent on discharge is 31 mins in patient examination, evaluation, counseling as well as medication reconciliation, prescriptions for required medications, discharge plan and follow up.    Electronically signed by   Drake Turpin MD  8/26/2024  6:58 PM      Thank you Dr. Londono primary care provider on file. for the opportunity to be involved in this patient's care.

## 2024-08-26 NOTE — CONSULTS
_                         Today's Date: 8/26/2024  Patient Name: Britton Navarro  Date of admission: 8/25/2024  9:23 PM  Patient's age: 49 y.o., 1975  Admission Dx: Acute alcohol intoxication delirium with moderate or severe use disorder (HCC) [F10.221]  Acute alcoholic intoxication without complication (HCC) [F10.920]  Alcohol withdrawal delirium, acute, hyperactive (HCC) [F10.931]      Requesting Physician: Drake Turpin MD    CHIEF COMPLAINT: Alcohol intoxication.  Impending DTs.  Consult for pancytopenia.    History Obtained From:  patient, electronic medical record    HISTORY OF PRESENT ILLNESS:      The patient is a 49 y.o.  male who is admitted to the hospital for further management of alcoholic intoxication and impending DTs.  Patient presented to the emergency department when he was found unresponsive in the middle of the road.  He had alcohol intoxication.  He had similar episodes in the past.  He fell detoxication based on family members.  Patient is currently awake and alert.  He states that he drinks heavily over the last 3 years.  He had significant improvement since admission.  Continues to have tremors.  No nausea or vomiting.  No fever.  No aspiration.  Patient's labs showed pancytopenia.  We were consulted for evaluation.  Patient is aware of some blood disorders in the past but he was not very clear of the exact problem.  He denies any bruising.  No active bleeding.  No repeated infections.  Patient is not aware of liver disease.    Past Medical History:   has a past medical history of ETOH abuse.    Past Surgical History:   has no past surgical history on file.   Family History: family history is not on file.  Social History:   reports that he has never smoked. He does not have any smokeless tobacco history on file. He reports current alcohol use. He reports current drug use. Drug: Marijuana (Weed).   Medications:    Prior to  mastoid air cells demonstrate  no acute abnormality.    SOFT TISSUES/SKULL:  No acute abnormality of the visualized skull or soft  tissues.    CT CERVICAL SPINE FINDINGS:  BONES/ALIGNMENT: There is no evidence of an acute cervical spine fracture.  There is normal alignment of the cervical spine.    DEGENERATIVE CHANGES: No significant degenerative changes.    SOFT TISSUES: There is no prevertebral soft tissue swelling.  Impression: 1. CT HEAD: No acute intracranial abnormality.  2. CT CERVICAL SPINE: No acute abnormality of the cervical spine.            IMPRESSION:    Primary Problem  Acute alcohol intoxication delirium with moderate or severe use disorder (HCC)    Active Hospital Problems    Diagnosis Date Noted    Daily consumption of alcohol [Z78.9] 08/26/2024    Pancytopenia (HCC) [D61.818] 08/26/2024    Acute alcoholic intoxication without complication (HCC) [F10.920] 08/26/2024    Alcohol withdrawal delirium, acute, hyperactive (HCC) [F10.931] 08/26/2024    Acute alcohol intoxication delirium with moderate or severe use disorder (HCC) [F10.221] 08/25/2024     Acute alcoholic intoxication.  Impending DTs.  Pancytopenia secondary to heavy alcohol abuse.    RECOMMENDATIONS:  Records and labs and images were reviewed and discussed with the patient.  No further history was obtainable apart from what is mentioned above.  Patient is currently recovering well and feeling much better since admission.  Continues to have slight agitation and tremors.  Continue current care by primary team.  Pancytopenia was discussed with the patient.  Pancytopenia is very likely result of bone marrow suppression from alcoholic intoxication and chronic alcohol abuse.  Probable alcoholic liver disease as well.  Labs will be monitored closely and other labs will be ordered to rule out other possibilities.  Counseled about importance of alcohol cessation.  Patient's questions were answered to the best of his satisfaction and he verbalized

## 2024-08-26 NOTE — ED PROVIDER NOTES
Urinalysis with Microscopic    Urine Drug Screen    Magnesium    Calcium, Ionized    Phosphorus    TSH without Reflex    Hemoglobin A1C    Protime-INR    ADULT DIET; Regular    Vital signs per unit routine    Notify physician    Notify physician    Bedrest with bathroom privileges    Daily weights    Intake and output    Initiate Hypoglycemia Treatment    Elevate Head of Bed     Telemetry monitoring - Continuous    Place intermittent pneumatic compression device    Full Code    Inpatient consult to Social Work    Initiate Oxygen Therapy Protocol    Pulse oximetry, continuous    EKG 12 Lead    EKG 12 lead    Saline lock IV    Place in Observation Service    Alcohol and or drug assessment    Seizure precautions    Fall precautions       Medications Ordered:  Orders Placed This Encounter   Medications    sodium chloride 0.9 % 1,000 mL with folic acid 1 mg, adult multi-vitamin with vitamin k 10 mL, thiamine 100 mg    lactated ringers bolus 1,000 mL    0.9 % sodium chloride infusion    sodium chloride flush 0.9 % injection 5-40 mL    sodium chloride flush 0.9 % injection 10 mL    0.9 % sodium chloride infusion    thiamine mononitrate tablet 100 mg    OR Linked Order Group     potassium chloride (KLOR-CON M) extended release tablet 40 mEq     potassium bicarb-citric acid (EFFER-K) effervescent tablet 40 mEq     potassium chloride 10 mEq/100 mL IVPB (Peripheral Line)    OR Linked Order Group     ondansetron (ZOFRAN-ODT) disintegrating tablet 4 mg     ondansetron (ZOFRAN) injection 4 mg    polyethylene glycol (GLYCOLAX) packet 17 g    OR Linked Order Group     acetaminophen (TYLENOL) tablet 650 mg     acetaminophen (TYLENOL) suppository 650 mg    magnesium sulfate 2000 mg in 50 mL IVPB premix    DISCONTD: LORazepam (ATIVAN) tablet 1 mg    DISCONTD: LORazepam (ATIVAN) injection 1 mg    DISCONTD: LORazepam (ATIVAN) tablet 2 mg    DISCONTD: LORazepam (ATIVAN) injection 2 mg    DISCONTD: LORazepam (ATIVAN) tablet 3 mg     testing or timing of collection.       Consults:  Rosamaria Dunbar CNP- [place]    Procedures:  None    Re-Evaluation & Disposition:  See ED Course notes above.    The patient and/or family/guardian and I have discussed the diagnosis and risks, and we agree with admission to [facility]. The patient appears appropriate for admission. The patient and/or family/guardian understands that at this time, there is a medical necessity for admission. Routine admission counseling was given and the patient and/or family/guardian understands the rationale surrounding the decision for admission.     I have reviewed the disposition diagnosis with the patient and/or their family/guardian. I have answered their questions and made a plan for admission through shared decision making. They voiced understanding of these plans and did not have any further questions or concerns.     This patient was seen by the attending physician and they agreed with the assessment and plan.       FINAL IMPRESSION      1. Acute alcoholic intoxication without complication (HCC)          DISPOSITION / PLAN     Disposition Admitted    Patient Refferred to:  No follow-up provider specified.    Discharge Medications:  New Prescriptions    No medications on file       KENYON Rodriguez CNP   Emergency Medicine Nurse Practitioner    (Please note that portions of this note were completed with a voice recognitionprogram.  Efforts were made to edit the dictations but occasionally words are mis-transcribed.)       Rodo Hernandez APRN - CNP  08/26/24 0112

## 2024-08-26 NOTE — CARE COORDINATION
Case Management Assessment  Initial Evaluation    Date/Time of Evaluation: 8/26/2024 12:54 PM  Assessment Completed by: Carri Mullins RN    If patient is discharged prior to next notation, then this note serves as note for discharge by case management.    Patient Name: Britton Navarro                   YOB: 1975  Diagnosis: Acute alcohol intoxication delirium with moderate or severe use disorder (HCC) [F10.221]  Acute alcoholic intoxication without complication (HCC) [F10.920]                   Date / Time: 8/25/2024  9:23 PM    Patient Admission Status: Observation   Readmission Risk (Low < 19, Mod (19-27), High > 27): No data recorded  Current PCP: No primary care provider on file.  PCP verified by CM? No    Chart Reviewed: Yes      History Provided by: Patient  Patient Orientation: Alert and Oriented    Patient Cognition: Alert    Hospitalization in the last 30 days (Readmission):  No    If yes, Readmission Assessment in CM Navigator will be completed.    Advance Directives:      Code Status: Full Code   Patient's Primary Decision Maker is: Legal Next of Kin      Discharge Planning:    Patient lives with: Alone Type of Home: House  Primary Care Giver: Self  Patient Support Systems include: Friends/Neighbors   Current Financial resources: None  Current community resources: None  Current services prior to admission: None            Current DME:              Type of Home Care services:  None    ADLS  Prior functional level: Independent in ADLs/IADLs  Current functional level: Independent in ADLs/IADLs    PT AM-PAC:   /24  OT AM-PAC:   /24    Family can provide assistance at DC: No  Would you like Case Management to discuss the discharge plan with any other family members/significant others, and if so, who?    Plans to Return to Present Housing: Yes  Other Identified Issues/Barriers to RETURNING to current housing: clinical status  Potential Assistance needed at discharge: N/A            Potential  Representative Name:       The Patient and/or Patient Representative Agree with the Discharge Plan? Yes    Carri Mullins RN  Case Management Department  Ph: [phone]

## 2024-08-26 NOTE — H&P
[facility]  Office: [phone]  Diony Erickson DO, Hunter Teran DO, Lorenzo Bobo DO, Gabriel Britton DO, Anita Ingram MD, Marlys Ramirez MD, Yudelka Montenegro MD, Emilee Barnhart MD,  Gregg Nassar MD, Ashlie Conteh MD, Sarah Wood MD,  Jessy Vincent DO, Yakov Núñez MD, Vidal Stinson MD, Britton Erickson DO, Griselda Schultz MD,  Ken Smith DO, Debbie White MD, Ellie Reis MD, Clarissa Hyde MD, Gurpreet Dasilva MD,  Rafat Warner MD, Jeramy Urias MD, Oanh Belle MD, Nathan Roa MD, Drake Turpin MD, Madan Gonsales MD, Lloyd Dumont DO, Ja Gomez DO, Sari Santoyo MD,  Elton Almonte MD, Shirley Waterhouse, CNP,  Mechelle Calhoun, CNP, Bishop Gonzalez, CNP,  Maria Del Carmen Figueroa, DNP, Nadeen Mcbride, CNP, Ashley Soriano, CNP, Lauren Rodriguez CNP, Rosamaria Dunbar, CNP, Miladys Bright, CNP, Cheryl Ramos, PA-C, Elaine Horner, PA-C, Josy Bradford, CNP, Selina Ge, CNP, Belkys Dick, CNP, Razia Chao, CNS, Renate Ontiveros, CNP, Tracey Olson, CNP, Tracy Schwab, CNP         [facility]   IN-PATIENT SERVICE   [facility]    HISTORY AND PHYSICAL EXAMINATION            Date:   8/26/2024  Patient name:  Britton Navarro  Date of admission:  8/25/2024  9:23 PM  MRN:   [id number]  Account:  [id number]  YOB: 1975  PCP:    No primary care provider on file.  Room:   ER04/ER04  Code Status:    Full Code    Chief Complaint:     Chief Complaint   Patient presents with    Alcohol Intoxication       History Obtained From:     electronic medical record, reason patient could not give history:  altered mental status    History of Present Illness:     Britton Navarro is a 49 y.o. Non- / non  male who presents with Alcohol Intoxication   and is admitted to the hospital for the management of Acute alcohol intoxication delirium with moderate or severe use disorder (HCC).    Patient presents to the emergency department after reports of the  visit. At least 50% of the time documented was spent with the patient to provide counseling and/or coordination of care.    KENYON Serrano - CNP  8/26/2024  1:53 AM    Copy sent to Dr. Londono primary care provider on file.

## 2024-08-26 NOTE — PLAN OF CARE
Problem: Discharge Planning  Goal: Discharge to home or other facility with appropriate resources  8/26/2024 1119 by Bhanu Park, RN  Outcome: Progressing  Flowsheets (Taken 8/26/2024 0529 by Mary Joe, RN)  Discharge to home or other facility with appropriate resources:   Identify barriers to discharge with patient and caregiver   Arrange for needed discharge resources and transportation as appropriate   Identify discharge learning needs (meds, wound care, etc)   Arrange for interpreters to assist at discharge as needed   Refer to discharge planning if patient needs post-hospital services based on physician order or complex needs related to functional status, cognitive ability or social support system  8/26/2024 0529 by Mary Joe, RN  Outcome: Progressing  Flowsheets (Taken 8/26/2024 0529)  Discharge to home or other facility with appropriate resources:   Identify barriers to discharge with patient and caregiver   Arrange for needed discharge resources and transportation as appropriate   Identify discharge learning needs (meds, wound care, etc)   Arrange for interpreters to assist at discharge as needed   Refer to discharge planning if patient needs post-hospital services based on physician order or complex needs related to functional status, cognitive ability or social support system     Problem: Pain  Goal: Verbalizes/displays adequate comfort level or baseline comfort level  8/26/2024 1119 by Bhanu Park, RN  Outcome: Progressing  Flowsheets (Taken 8/26/2024 0529 by Mary Joe, RN)  Verbalizes/displays adequate comfort level or baseline comfort level:   Encourage patient to monitor pain and request assistance   Assess pain using appropriate pain scale   Administer analgesics based on type and severity of pain and evaluate response   Implement non-pharmacological measures as appropriate and evaluate response   Consider cultural and social influences on pain and pain management    intake and initiate nutrition consult as needed   Instruct patient on self management of diabetes and initiate consult as needed

## 2024-08-26 NOTE — PROGRESS NOTES
Spiritual Health Assessment/Progress Note  [facility]    (P) Loneliness/Social Isolation, (P) Trauma, Emotional distress, (P) Anticipatory Grief, Life Adjustments, Adjustment to illness,      Name: Britton Navarro MRN: [id number]    Age: 49 y.o.     Sex: male   Language: English   Yarsanism: Hinduism   Acute alcohol intoxication delirium with moderate or severe use disorder (HCC)     Date: 8/26/2024            Total Time Calculated: (P) 15 min              Spiritual Assessment began in [facility]        Referral/Consult From: (P) Nurse   Encounter Overview/Reason: (P) Loneliness/Social Isolation  Service Provided For: (P) Patient     visited Pt for visit.  had previously seen Pt about a month or so ago. Pt was open to prayer and   provided support and encouragement,.  advises follow up visit in a day or so.    Amna, Belief, Meaning:   Patient identifies as spiritual, is connected with a amna tradition or spiritual practice, and has beliefs or practices that help with coping during difficult times  Family/Friends No family/friends present      Importance and Influence:  Patient has spiritual/personal beliefs that influence decisions regarding their health  Family/Friends no family/friends present    Community:  Patient is connected with a spiritual community and expresses feelings of isolation: disconnected from family/friends and feeling there is no one to turn to for help  Family/Friends Other: no family present    Assessment and Plan of Care:     Patient Interventions include: Facilitated expression of thoughts and feelings, Explored spiritual coping/struggle/distress and theological reflection, and Other: Pt was not feeling well and would have a much better conversation after he feels better  Family/Friends Interventions include: Other: no family or friends present.    Patient Plan of Care: Spiritual Care available upon further referral  Family/Friends Plan

## 2024-08-26 NOTE — ED PROVIDER NOTES
[facility] Emergency Department      Pt Name: Britton Navarro  MRN: [id number]  Birthdate 1975  Date of evaluation: 8/25/2024    EMERGENCY DEPARTMENT ENCOUNTER      PERTINENT ATTENDING PHYSICIAN COMMENTS:      Faculty Attestation    I performed a history and physical examination of the patient and discussed management with the mid level provideer. I reviewed the mid level provider's note and agree with the documented findings and plan of care.Any areas of disagreement are noted on the chart. I was personally present for the key portions of any procedures. I have documented in the chart those procedures where I was not present during the key portions. I have reviewed the emergency nurses triage note. I agree with the chief complaint, past medical history, past surgical history, allergies, medications, social and family history as documented unless otherwise noted below. Documentation of the HPI, Physical Exam and Medical Decision Making performed by medical students or scribes is based on my personal performance of the HPI, PE and MDM. For Residents/Physician Assistant/ Nurse Practitioner cases/documentation I have personally evaluated this patient and have completed at least one if not all key elements of the E/M (history, physical exam, and MDM). Additional findings are as noted.    CHIEF COMPLAINT       Chief Complaint   Patient presents with    Alcohol Intoxication       HISTORY OF PRESENT ILLNESS    Britton Navarro is a 49 y.o. male who presents to the emergency department via EMS for alcohol intoxication.  Found about a block from his home on the ground.  Patient denies injury but he is very intoxicated.  He does not know his last drink.  Unable to give an accurate history.    PAST MEDICAL HISTORY    has a past medical history of ETOH abuse.    SURGICAL HISTORY      has no past surgical history on file.    CURRENT MEDICATIONS       Previous Medications    No medications on file  107 mmol/L    CO2 26 20 - 31 mmol/L    Anion Gap 17 9 - 17 mmol/L    Glucose 111 (H) 70 - 99 mg/dL    BUN 4 (L) 6 - 20 mg/dL    Creatinine 0.8 0.7 - 1.2 mg/dL    Est, Glom Filt Rate >90 >60 mL/min/1.73m2    Calcium 8.4 (L) 8.6 - 10.4 mg/dL   CBC with Auto Differential   Result Value Ref Range    WBC 2.8 (L) 3.5 - 11.0 k/uL    RBC 3.87 (L) 4.5 - 5.9 m/uL    Hemoglobin 12.2 (L) 13.5 - 17.5 g/dL    Hematocrit 36.5 (L) 41 - 53 %    MCV 94.4 80 - 100 fL    MCH 31.5 26 - 34 pg    MCHC 33.4 31 - 37 g/dL    RDW 14.6 12.5 - 15.4 %    Platelets 101 (L) 140 - 450 k/uL    MPV 7.3 6.0 - 12.0 fL    Neutrophils % 67 (H) 36 - 66 %    Lymphocytes % 29 24 - 44 %    Monocytes % 4 1 - 7 %    Eosinophils % 0 (L) 1 - 4 %    Basophils % 0 0 - 2 %    Neutrophils Absolute 1.88 1.8 - 7.7 k/uL    Lymphocytes Absolute 0.81 (L) 1.0 - 4.8 k/uL    Monocytes Absolute 0.11 0.1 - 0.8 k/uL    Eosinophils Absolute 0.00 0.0 - 0.4 k/uL    Basophils Absolute 0.00 0.0 - 0.2 k/uL    Morphology Normal    Hepatic Function Panel   Result Value Ref Range    Albumin 4.3 3.5 - 5.2 g/dL    Alkaline Phosphatase 103 40 - 129 U/L    ALT 15 5 - 41 U/L    AST 32 <40 U/L    Total Bilirubin 0.4 0.3 - 1.2 mg/dL    Bilirubin, Direct 0.1 <0.3 mg/dL    Bilirubin, Indirect 0.3 0.0 - 1.0 mg/dL    Total Protein 7.5 6.4 - 8.3 g/dL    Albumin/Globulin Ratio 1.3 1.0 - 2.5   Lipase   Result Value Ref Range    Lipase 25 13 - 60 U/L   Magnesium   Result Value Ref Range    Magnesium 1.7 1.6 - 2.6 mg/dL   Ethanol   Result Value Ref Range    Ethanol Lvl 479 (HH) <10 mg/dL    Ethanol percent 0.479 (H) <0.010 %   Urinalysis with Microscopic   Result Value Ref Range    Color, UA Yellow Yellow    Turbidity UA Clear Clear    Glucose, Ur NEGATIVE NEGATIVE mg/dL    Bilirubin, Urine NEGATIVE NEGATIVE    Ketones, Urine NEGATIVE NEGATIVE mg/dL    Specific Gravity, UA 1.004 (L) 1.005 - 1.030    Urine Hgb NEGATIVE NEGATIVE    pH, Urine 6.0 5.0 - 8.0    Protein, UA NEGATIVE NEGATIVE mg/dL

## 2024-08-26 NOTE — PLAN OF CARE
Problem: Safety - Medical Restraint  Goal: Remains free of injury from restraints (Restraint for Interference with Medical Device)  Description: INTERVENTIONS:  1. Determine that other, less restrictive measures have been tried or would not be effective before applying the restraint  2. Evaluate the patient's condition at the time of restraint application  3. Inform patient/family regarding the reason for restraint  4. Q2H: Monitor safety, psychosocial status, comfort, nutrition and hydration  Outcome: Completed  Note: PT was never placed in restraints.      Problem: Discharge Planning  Goal: Discharge to home or other facility with appropriate resources  8/26/2024 1822 by Bhanu Park, RN  Outcome: Completed  8/26/2024 1119 by Bhanu Park, RN  Outcome: Progressing  Flowsheets (Taken 8/26/2024 0529 by Mary Joe, RN)  Discharge to home or other facility with appropriate resources:   Identify barriers to discharge with patient and caregiver   Arrange for needed discharge resources and transportation as appropriate   Identify discharge learning needs (meds, wound care, etc)   Arrange for interpreters to assist at discharge as needed   Refer to discharge planning if patient needs post-hospital services based on physician order or complex needs related to functional status, cognitive ability or social support system  8/26/2024 0529 by Mary Joe, RN  Outcome: Progressing  Flowsheets (Taken 8/26/2024 0529)  Discharge to home or other facility with appropriate resources:   Identify barriers to discharge with patient and caregiver   Arrange for needed discharge resources and transportation as appropriate   Identify discharge learning needs (meds, wound care, etc)   Arrange for interpreters to assist at discharge as needed   Refer to discharge planning if patient needs post-hospital services based on physician order or complex needs related to functional status, cognitive ability or social support

## 2024-08-26 NOTE — PLAN OF CARE
Spoke to Giovanny over phone to discuss about patient's medical condition and discharge planning.  Giovanny gave me Sherlyn (patient's wife) cell phone number [phone].  He mentioned that it is it would be appropriate to call her and she will be happy to assist us with discharge.  Writer did mention that we try to call her before but it went straight to voicemail.  The conversation was witnessed by MELODIE Drummond.    Writer tried to reach out to Sherlyn on the number provided, unfortunately no answer.  Left voicemail.    Electronically signed by Drake Turpin MD on 8/26/2024 at 2:12 PM      Spoke to patient's wife Sherlyn, Sherlyn reported that they are legally  but lives separately.  As per recent outpatient labs and a separate homes which they are trying to sell.  She also mentioned that he was doing well until COVID and after that he started drinking and it got worse.  She also think that he has some psychological issues which were never addressed and he never saw psychiatry.    Discussed with , RN and house obtained and are 60..  Will continue to monitor him while he is inpatient.  Will recheck his blood alcohol levels at 6 PM.  If it continues to trend down, patient can be discharged with prescription of Ativan if wife can come and pick him up.    Electronically signed by Drake Turpin MD on 8/26/2024 at 4:04 PM

## 2024-08-27 LAB
PATH REV BLD -IMP: NORMAL
SURGICAL PATHOLOGY REPORT: NORMAL

## 2024-08-28 LAB — COPPER SERPL-MCNC: 102.7 UG/DL (ref 70–140)

## 2024-11-12 ENCOUNTER — HOSPITAL ENCOUNTER (OUTPATIENT)
Dept: MRI IMAGING | Facility: CLINIC | Age: 49
Discharge: HOME | End: 2024-11-14
Payer: COMMERCIAL

## 2024-11-12 DIAGNOSIS — R52 PAIN: ICD-10-CM

## 2024-11-12 PROCEDURE — 73718 MRI LOWER EXTREMITY W/O DYE: CPT

## 2024-11-12 PROCEDURE — 73721 MRI JNT OF LWR EXTRE W/O DYE: CPT

## 2024-11-19 ENCOUNTER — APPOINTMENT (OUTPATIENT)
Dept: CT IMAGING | Age: 49
End: 2024-11-19
Payer: COMMERCIAL

## 2024-11-19 ENCOUNTER — HOSPITAL ENCOUNTER (EMERGENCY)
Age: 49
Discharge: HOME | End: 2024-11-19
Attending: STUDENT IN AN ORGANIZED HEALTH CARE EDUCATION/TRAINING PROGRAM
Payer: COMMERCIAL

## 2024-11-19 ENCOUNTER — APPOINTMENT (OUTPATIENT)
Dept: MRI IMAGING | Age: 49
End: 2024-11-19
Payer: COMMERCIAL

## 2024-11-19 ENCOUNTER — APPOINTMENT (OUTPATIENT)
Dept: GENERAL RADIOLOGY | Age: 49
End: 2024-11-19
Payer: COMMERCIAL

## 2024-11-19 VITALS
WEIGHT: 156.53 LBS | DIASTOLIC BLOOD PRESSURE: 75 MMHG | TEMPERATURE: 99.3 F | BODY MASS INDEX: 22.41 KG/M2 | HEIGHT: 70 IN | SYSTOLIC BLOOD PRESSURE: 110 MMHG | RESPIRATION RATE: 18 BRPM | OXYGEN SATURATION: 97 % | HEART RATE: 99 BPM

## 2024-11-19 DIAGNOSIS — M21.372 FOOT DROP, LEFT: Primary | ICD-10-CM

## 2024-11-19 PROCEDURE — 72148 MRI LUMBAR SPINE W/O DYE: CPT

## 2024-11-19 PROCEDURE — 70450 CT HEAD/BRAIN W/O DYE: CPT

## 2024-11-19 PROCEDURE — 99284 EMERGENCY DEPT VISIT MOD MDM: CPT | Performed by: STUDENT IN AN ORGANIZED HEALTH CARE EDUCATION/TRAINING PROGRAM

## 2024-11-19 ASSESSMENT — PAIN DESCRIPTION - PAIN TYPE: TYPE: ACUTE PAIN

## 2024-11-19 ASSESSMENT — PAIN - FUNCTIONAL ASSESSMENT: PAIN_FUNCTIONAL_ASSESSMENT: 0-10

## 2024-11-19 ASSESSMENT — PAIN DESCRIPTION - ORIENTATION: ORIENTATION: LEFT

## 2024-11-19 ASSESSMENT — PAIN SCALES - GENERAL: PAINLEVEL_OUTOF10: 9

## 2024-11-19 ASSESSMENT — PAIN DESCRIPTION - LOCATION: LOCATION: LEG

## 2024-11-20 NOTE — ED PROVIDER NOTES
[facility] Emergency Department  [address]  [facility] [address]  Phone: [phone]  Fax: [phone]      Attending Physician Attestation    I performed a history and physical examination of the patient and discussed management with the mid level provider. I reviewed the mid level provider's note and agree with the documented findings and plan of care. Any areas of disagreement are noted on the chart. I was personally present for the key portions of any procedures. I have documented in the chart those procedures where I was not present during the key portions. I have reviewed the emergency nurses triage note. I agree with the chief complaint, past medical history, past surgical history, allergies, medications, social and family history as documented unless otherwise noted below. Documentation of the HPI, Physical Exam and Medical Decision Making performed by mid level providers is based on my personal performance of the HPI, PE and MDM. For Physician Assistant/ Nurse Practitioner cases/documentation I have personally evaluated this patient and have completed at least one if not all key elements of the E/M (history, physical exam, and MDM). Additional findings are as noted.      Chief Complaint   Patient presents with    Leg Pain     Left lower leg injury from 4 weeks ago..  Nagging pains and numbness in lower leg.  Pt describes foot drop, and numbness with tingling and stinging pains at toes and foot and lower leg.         INITIAL VITALS:  height is 1.78 m (5' 10.08\") and weight is 71 kg (156 lb 8.4 oz). His oral temperature is 99.3 °F (37.4 °C). His blood pressure is 110/75 and his pulse is 99. His respiration is 18 and oxygen saturation is 97%.       DIAGNOSTIC RESULTS       RADIOLOGY:   Non-plain film images such as CT, Ultrasound and MRI are read by the radiologist. Plain radiographic images are visualized and the radiologist interpretations are reviewed as follows: 
discharge medications for this patient.    Controlled Substances Monitoring:          No data to display                (Please note that portions of this note were completed with a voice recognition program.  Efforts were made to edit the dictations but occasionally words are mis-transcribed.)    KENYON Miranda - CNP (electronically signed)             Dejah Roche, KENYON - CNP  11/19/24 8023

## 2024-11-20 NOTE — DISCHARGE INSTRUCTIONS
We are concerned for a peroneal nerve entrapment. You will need an EMG and MRI of the knee. I have discussed this with neurology Dr. Millan and neurosurgery Dr. Beverley Lowery. Typically neurology does the EMG and neurosurgery will follow you based on MRI lumbar spine results. Please begin with Dr. Beverley Lowery. Call his office tomorrow for further direction.  Return to the ER for worsening symptoms, numbness, tingling that changes, weakness that changes, fevers, loss of bowel or bladder control, or any other worsening symptoms or concerns.

## 2024-11-21 ENCOUNTER — TELEPHONE (OUTPATIENT)
Dept: NEUROSURGERY | Age: 49
End: 2024-11-21

## 2024-11-21 NOTE — TELEPHONE ENCOUNTER
Patient wife Sherlyn ([phone]) called and stated pt discharge papers said to follow up with Dr. Lowery and will need a follow up with a MRI of the knee and EMG. Can we schedule pt with a virtual visit? Also would we order the MRI of the knee? Please advise

## 2024-11-26 ENCOUNTER — TELEPHONE (OUTPATIENT)
Dept: NEUROLOGY | Age: 49
End: 2024-11-26

## 2024-11-26 ENCOUNTER — OFFICE VISIT (OUTPATIENT)
Dept: NEUROSURGERY | Age: 49
End: 2024-11-26
Payer: COMMERCIAL

## 2024-11-26 VITALS
BODY MASS INDEX: 22.33 KG/M2 | HEART RATE: 109 BPM | HEIGHT: 70 IN | SYSTOLIC BLOOD PRESSURE: 123 MMHG | WEIGHT: 156 LBS | DIASTOLIC BLOOD PRESSURE: 83 MMHG

## 2024-11-26 DIAGNOSIS — G57.32 PERONEAL NEUROPATHY, LEFT: Primary | ICD-10-CM

## 2024-11-26 PROCEDURE — 99204 OFFICE O/P NEW MOD 45 MIN: CPT | Performed by: NEUROLOGICAL SURGERY

## 2024-11-26 RX ORDER — GABAPENTIN 300 MG/1
300 CAPSULE ORAL 3 TIMES DAILY
Qty: 90 CAPSULE | Refills: 2 | Status: SHIPPED | OUTPATIENT
Start: 2024-11-26 | End: 2025-02-24

## 2024-11-26 NOTE — TELEPHONE ENCOUNTER
Patient has not seen our providers yet. He was seen in ER. He had an appointment with neurosurgery today. He is going to [place] for his EMG.

## 2024-11-26 NOTE — PROGRESS NOTES
to physical therapy for stretching and exercises for his left lower extremity.    We will obtain an EMG of the lower extremities to confirm peroneal neuropathy    We will obtain an MRI of the left knee to investigate for possible left peroneal nerve compression    We will prescribe the patient Gabapentin for relief of his symptoms.    Follow up in 2 months    By signing my name below, I, Jim Martinez, attest that this documentation has been prepared under the direction and in the presence of Beverley Lowery DO. Electronically signed: Krista Billings,     11/26/2024    This note was created using voice recognition software. There may be inaccuracies of transcription  that are inadvertently overlooked prior to the signature.  There is any questions about the transcription please contact me.

## 2024-12-09 ENCOUNTER — HOSPITAL ENCOUNTER (OUTPATIENT)
Dept: MRI IMAGING | Age: 49
Discharge: HOME | End: 2024-12-11
Attending: NEUROLOGICAL SURGERY
Payer: COMMERCIAL

## 2024-12-09 DIAGNOSIS — G57.32 PERONEAL NEUROPATHY, LEFT: ICD-10-CM

## 2024-12-09 PROCEDURE — 2580000003 HC RX 258: Performed by: NEUROLOGICAL SURGERY

## 2024-12-09 PROCEDURE — 6360000004 HC RX CONTRAST MEDICATION: Performed by: NEUROLOGICAL SURGERY

## 2024-12-09 PROCEDURE — A9579 GAD-BASE MR CONTRAST NOS,1ML: HCPCS | Performed by: NEUROLOGICAL SURGERY

## 2024-12-09 PROCEDURE — 73723 MRI JOINT LWR EXTR W/O&W/DYE: CPT

## 2024-12-09 RX ORDER — SODIUM CHLORIDE 0.9 % (FLUSH) 0.9 %
10 SYRINGE (ML) INJECTION ONCE
Status: COMPLETED | OUTPATIENT
Start: 2024-12-09 | End: 2024-12-09

## 2024-12-09 RX ADMIN — GADOTERIDOL 14 ML: 279.3 INJECTION, SOLUTION INTRAVENOUS at 14:15

## 2024-12-09 RX ADMIN — SODIUM CHLORIDE, PRESERVATIVE FREE 10 ML: 5 INJECTION INTRAVENOUS at 14:16

## 2025-01-16 ENCOUNTER — OFFICE VISIT (OUTPATIENT)
Dept: NEUROSURGERY | Age: 50
End: 2025-01-16
Payer: COMMERCIAL

## 2025-01-16 VITALS
DIASTOLIC BLOOD PRESSURE: 73 MMHG | HEIGHT: 70 IN | BODY MASS INDEX: 22.33 KG/M2 | HEART RATE: 72 BPM | WEIGHT: 156 LBS | SYSTOLIC BLOOD PRESSURE: 110 MMHG

## 2025-01-16 DIAGNOSIS — G57.32 PERONEAL NEUROPATHY, LEFT: ICD-10-CM

## 2025-01-16 DIAGNOSIS — G57.02 SCIATIC NEUROPATHY, LEFT: Primary | ICD-10-CM

## 2025-01-16 PROBLEM — D69.6 THROMBOCYTOPENIA (HCC): Status: RESOLVED | Noted: 2024-08-26 | Resolved: 2025-01-16

## 2025-01-16 PROBLEM — D61.818 PANCYTOPENIA (HCC): Status: RESOLVED | Noted: 2024-08-26 | Resolved: 2025-01-16

## 2025-01-16 PROBLEM — F10.931 ALCOHOL WITHDRAWAL DELIRIUM, ACUTE, HYPERACTIVE (HCC): Status: RESOLVED | Noted: 2024-08-26 | Resolved: 2025-01-16

## 2025-01-16 PROBLEM — Z78.9 DAILY CONSUMPTION OF ALCOHOL: Status: RESOLVED | Noted: 2024-08-26 | Resolved: 2025-01-16

## 2025-01-16 PROBLEM — F10.920 ACUTE ALCOHOLIC INTOXICATION WITHOUT COMPLICATION (HCC): Status: RESOLVED | Noted: 2024-08-26 | Resolved: 2025-01-16

## 2025-01-16 PROCEDURE — 1036F TOBACCO NON-USER: CPT | Performed by: NEUROLOGICAL SURGERY

## 2025-01-16 PROCEDURE — G8427 DOCREV CUR MEDS BY ELIG CLIN: HCPCS | Performed by: NEUROLOGICAL SURGERY

## 2025-01-16 PROCEDURE — 99214 OFFICE O/P EST MOD 30 MIN: CPT | Performed by: NEUROLOGICAL SURGERY

## 2025-01-16 PROCEDURE — G8420 CALC BMI NORM PARAMETERS: HCPCS | Performed by: NEUROLOGICAL SURGERY

## 2025-01-16 NOTE — PROGRESS NOTES
Department of Neurosurgery                                                      Follow up visit      History Obtained From:  patient, spouse    CHIEF COMPLAINT:         Chief Complaint   Patient presents with    Follow-up     Review EMG/ MRI        HISTORY OF PRESENT ILLNESS:       The patient is a 49 y.o. male who presents for follow up for left peroneal neuropathy.    The patient reports that since the last visit he feels that there has been some improvement to his left lower extremity symptoms. He cites the improvement as having increased sensation and strength in the calf that was not there prior. He says that the \"Jell-o\" feeling in his left lower extremity is reduced and it feels more stable proximally. Britton continues to use a boot over the right lower extremity which aids his ability to ambulate. He reports that he is still unable to flex his foot up and down, or move his toes. He also complains of an occasional electric shock feeling that radiates down the extremity to the foot. He exclaims that he will occasionally have swelling of his left lower extremity, he correlates this with increased use and starting physical therapy recently. The patient has only had one session of physical therapy thus far. He complains that his left knee is sore because he depends on it for ambulation. Britton denies any night sweats, chills, fever, or sudden unexplained weight loss/gain.    To recall, this onset the morning after a fall in October. The patient had woken up in the middle of the night to use the restroom and had rolled his left ankle and fallen. After the fall he had some increased pain in the ankle but went back to bed. He began having sudden weakness and numbness in this extremity the morning after. This eventually progressed until he was evaluated in the ED on November 19th 2024. In addition to this, he reports that in hindsight he had been noticing some transient weakness and left-sided low back